# Patient Record
Sex: FEMALE | Race: OTHER | NOT HISPANIC OR LATINO | Employment: UNEMPLOYED | URBAN - METROPOLITAN AREA
[De-identification: names, ages, dates, MRNs, and addresses within clinical notes are randomized per-mention and may not be internally consistent; named-entity substitution may affect disease eponyms.]

---

## 2021-05-14 ENCOUNTER — OFFICE VISIT (OUTPATIENT)
Dept: URGENT CARE | Facility: PHYSICIAN GROUP | Age: 61
End: 2021-05-14

## 2021-05-14 VITALS
OXYGEN SATURATION: 98 % | HEIGHT: 66 IN | RESPIRATION RATE: 18 BRPM | TEMPERATURE: 97.3 F | BODY MASS INDEX: 28.12 KG/M2 | WEIGHT: 175 LBS | DIASTOLIC BLOOD PRESSURE: 58 MMHG | SYSTOLIC BLOOD PRESSURE: 120 MMHG | HEART RATE: 80 BPM

## 2021-05-14 DIAGNOSIS — B02.9 HERPES ZOSTER WITHOUT COMPLICATION: ICD-10-CM

## 2021-05-14 PROCEDURE — 99203 OFFICE O/P NEW LOW 30 MIN: CPT | Performed by: PHYSICIAN ASSISTANT

## 2021-05-14 RX ORDER — LIDOCAINE HYDROCHLORIDE 30 MG/G
1 CREAM TOPICAL 3 TIMES DAILY PRN
Qty: 28 G | Refills: 0 | Status: SHIPPED | OUTPATIENT
Start: 2021-05-14

## 2021-05-14 RX ORDER — VALACYCLOVIR HYDROCHLORIDE 1 G/1
1000 TABLET, FILM COATED ORAL 3 TIMES DAILY
Qty: 21 TABLET | Refills: 0 | Status: SHIPPED | OUTPATIENT
Start: 2021-05-14 | End: 2021-05-21

## 2021-05-14 RX ORDER — METHYLPREDNISOLONE 4 MG/1
TABLET ORAL
Qty: 21 TABLET | Refills: 0 | Status: SHIPPED | OUTPATIENT
Start: 2021-05-14

## 2021-05-14 ASSESSMENT — ENCOUNTER SYMPTOMS
CHILLS: 0
TINGLING: 1
PALPITATIONS: 0
FEVER: 0

## 2021-05-14 NOTE — PROGRESS NOTES
Subjective:   Gisselle Jacinto is a 60 y.o. female who presents today with   Chief Complaint   Patient presents with   • Bump     x2 weeks, red bumps on chest and right side of back, painful        Rash  This is a new problem. The current episode started 1 to 4 weeks ago. The problem is unchanged. The affected locations include the chest and back. The rash is characterized by blistering, pain and redness. She was exposed to nothing. Pertinent negatives include no fever. Treatments tried: topical cream, powder. The treatment provided no relief.     Patient states it started with rash on the front of her right breast and seemed to spread around under the armpit into the upper back.  Patient denies any chest pain but does state the area around the rash under her right armpit feels like tingling and poking sensation especially with irritation from her shirt or laying on that side.  PMH:  has no past medical history on file.  MEDS:   Current Outpatient Medications:   •  valacyclovir (VALTREX) 1 GM Tab, Take 1 tablet by mouth 3 times a day for 7 days., Disp: 21 tablet, Rfl: 0  •  methylPREDNISolone (MEDROL DOSEPAK) 4 MG Tablet Therapy Pack, Follow schedule on package instructions., Disp: 21 tablet, Rfl: 0  •  Lidocaine HCl 3 % Cream, Apply 1 Film topically 3 times a day as needed (pain)., Disp: 28 g, Rfl: 0  ALLERGIES: Not on File  SURGHX: History reviewed. No pertinent surgical history.  SOCHX:  reports that she has never smoked. She has never used smokeless tobacco. She reports that she does not drink alcohol and does not use drugs.  FH: Reviewed with patient, not pertinent to this visit.       Review of Systems   Constitutional: Negative for chills and fever.   Cardiovascular: Negative for chest pain and palpitations.   Skin: Positive for rash.   Neurological: Positive for tingling (Pins-and-needles around the areas of the rash).        Objective:   /58   Pulse 80   Temp 36.3 °C (97.3 °F) (Temporal)   Resp 18    "Ht 1.676 m (5' 6\")   Wt 79.4 kg (175 lb)   SpO2 98%   BMI 28.25 kg/m²   Physical Exam  Vitals and nursing note reviewed.   Constitutional:       General: She is not in acute distress.     Appearance: She is well-developed.   HENT:      Head: Normocephalic and atraumatic.      Right Ear: Hearing normal.      Left Ear: Hearing normal.   Eyes:      Conjunctiva/sclera: Conjunctivae normal.   Cardiovascular:      Rate and Rhythm: Normal rate.   Pulmonary:      Effort: Pulmonary effort is normal.   Musculoskeletal:      Comments: Normal movement in all 4 extremities   Skin:     General: Skin is warm and dry.             Comments: Vesicular rash to the right breast and to the right upper back over the scapula.  Most are dried and scabbed over but there are some new vesicles on the back.   Neurological:      Mental Status: She is alert.      Coordination: Coordination normal.   Psychiatric:         Mood and Affect: Mood normal.       Assessment/Plan:   Assessment    1. Herpes zoster without complication  - valacyclovir (VALTREX) 1 GM Tab; Take 1 tablet by mouth 3 times a day for 7 days.  Dispense: 21 tablet; Refill: 0  - methylPREDNISolone (MEDROL DOSEPAK) 4 MG Tablet Therapy Pack; Follow schedule on package instructions.  Dispense: 21 tablet; Refill: 0  - Lidocaine HCl 3 % Cream; Apply 1 Film topically 3 times a day as needed (pain).  Dispense: 28 g; Refill: 0  Discussed with patient I believe that the rash is healing well and she is likely on the tail end of this rash but I do recommend treatment with antiviral given that she has new vesicles showing up.   Differential diagnosis, natural history, supportive care, and indications for immediate follow-up discussed.   Patient given instructions and understanding of medications and treatment.    If not improving in 3-5 days, F/U with PCP or return to  if symptoms worsen.    Patient agreeable to plan.  Greater than 30 minutes were spent reviewing patient's chart, " examining and obtaining history from patient, and discussing plan of care.       Please note that this dictation was created using voice recognition software. I have made every reasonable attempt to correct obvious errors, but I expect that there are errors of grammar and possibly content that I did not discover before finalizing the note.    Nelson Stanley PA-C

## 2024-09-29 ENCOUNTER — OFFICE VISIT (OUTPATIENT)
Dept: URGENT CARE | Facility: CLINIC | Age: 64
End: 2024-09-29
Payer: COMMERCIAL

## 2024-09-29 VITALS
TEMPERATURE: 97.6 F | OXYGEN SATURATION: 98 % | HEART RATE: 88 BPM | BODY MASS INDEX: 28.93 KG/M2 | HEIGHT: 66 IN | RESPIRATION RATE: 18 BRPM | DIASTOLIC BLOOD PRESSURE: 68 MMHG | WEIGHT: 180 LBS | SYSTOLIC BLOOD PRESSURE: 118 MMHG

## 2024-09-29 DIAGNOSIS — R52 BODY ACHES: ICD-10-CM

## 2024-09-29 DIAGNOSIS — J06.9 VIRAL URI: ICD-10-CM

## 2024-09-29 LAB
FLUAV RNA SPEC QL NAA+PROBE: NEGATIVE
FLUBV RNA SPEC QL NAA+PROBE: NEGATIVE
RSV RNA SPEC QL NAA+PROBE: NEGATIVE
SARS-COV-2 RNA RESP QL NAA+PROBE: NEGATIVE

## 2024-09-29 RX ORDER — GLIMEPIRIDE 4 MG/1
4 TABLET ORAL EVERY MORNING
COMMUNITY

## 2024-09-29 RX ORDER — TRAZODONE HYDROCHLORIDE 100 MG/1
100 TABLET ORAL
COMMUNITY
Start: 2024-07-13

## 2024-09-29 RX ORDER — PANTOPRAZOLE SODIUM 40 MG/1
40 TABLET, DELAYED RELEASE ORAL
COMMUNITY
Start: 2024-08-18

## 2024-09-29 RX ORDER — ALBUTEROL SULFATE 90 UG/1
2 INHALANT RESPIRATORY (INHALATION) EVERY 6 HOURS PRN
Qty: 8.5 G | Refills: 0 | Status: SHIPPED | OUTPATIENT
Start: 2024-09-29

## 2024-09-29 RX ORDER — DILTIAZEM HYDROCHLORIDE 90 MG/1
90 CAPSULE, EXTENDED RELEASE ORAL EVERY 12 HOURS
COMMUNITY
Start: 2024-07-15

## 2024-09-29 RX ORDER — METOPROLOL SUCCINATE 25 MG/1
25 TABLET, EXTENDED RELEASE ORAL
COMMUNITY
Start: 2024-08-26

## 2024-09-29 RX ORDER — ATORVASTATIN CALCIUM 40 MG/1
40 TABLET, FILM COATED ORAL DAILY
COMMUNITY
Start: 2024-09-28

## 2024-09-29 RX ORDER — EMPAGLIFLOZIN, METFORMIN HYDROCHLORIDE 12.5; 1 MG/1; MG/1
1 TABLET, EXTENDED RELEASE ORAL
COMMUNITY
Start: 2024-07-25

## 2024-09-29 NOTE — LETTER
September 29, 2024         Patient: Gisselle Jacinto   YOB: 1960   Date of Visit: 9/29/2024           To Whom it May Concern:    Gisselle Jacinto was seen in my clinic on 9/29/2024. She may return to work on 10/3/24.    If you have any questions or concerns, please don't hesitate to call.        Sincerely,           NIKUNJ Yang  Electronically Signed

## 2024-09-30 ASSESSMENT — ENCOUNTER SYMPTOMS
SORE THROAT: 0
FEVER: 1
WHEEZING: 0
COUGH: 0
NAUSEA: 0
CHILLS: 1
DIARRHEA: 0
SHORTNESS OF BREATH: 1

## 2024-09-30 NOTE — PROGRESS NOTES
"Subjective:   Gisselle Jacinto is a 64 y.o. female who presents for Fever (Feels that she has a fever, body aches, chest feels tight and it hurts to breathe, shivering, since yesterday morning.)      Fever   This is a new problem. The current episode started yesterday. The problem occurs constantly. The problem has been unchanged. Associated symptoms include muscle aches and sleepiness. Pertinent negatives include no chest pain, coughing, diarrhea, ear pain, nausea, sore throat or wheezing. She has tried acetaminophen for the symptoms. The treatment provided no relief.   Risk factors: no recent sickness, no recent travel and no sick contacts        Review of Systems   Constitutional:  Positive for chills, fever and malaise/fatigue.   HENT:  Negative for ear pain and sore throat.    Respiratory:  Positive for shortness of breath. Negative for cough and wheezing.    Cardiovascular:  Negative for chest pain.   Gastrointestinal:  Negative for diarrhea and nausea.       Medications:    albuterol Aers  atorvastatin Tabs  diltiazem  glimepiride Tabs  Lidocaine HCl Crea  methylPREDNISolone Tbpk  metoprolol SR Tb24  pantoprazole Tbec  Synjardy XR Tb24  traZODone Tabs    Allergies: Patient has no known allergies.    Problem List: Gisselle Jacinto does not have a problem list on file.    Surgical History:  No past surgical history on file.    Past Social Hx: Gisselle Jacinto  reports that she has never smoked. She has never used smokeless tobacco. She reports that she does not drink alcohol and does not use drugs.     Past Family Hx:  Gisselle Jacinto family history is not on file.     Problem list, medications, and allergies reviewed by myself today in Epic.     Objective:     /68 (BP Location: Right arm, Patient Position: Sitting, BP Cuff Size: Adult)   Pulse 88   Temp 36.4 °C (97.6 °F) (Temporal)   Resp 18   Ht 1.676 m (5' 6\")   Wt 81.6 kg (180 lb)   SpO2 98%   BMI 29.05 kg/m²     Physical Exam  Constitutional:       General: She " is not in acute distress.     Appearance: She is well-developed.   HENT:      Head: Normocephalic and atraumatic.      Right Ear: Tympanic membrane normal.      Left Ear: Tympanic membrane normal.      Nose: Nose normal.      Right Sinus: No maxillary sinus tenderness or frontal sinus tenderness.      Left Sinus: No maxillary sinus tenderness or frontal sinus tenderness.      Mouth/Throat:      Mouth: Oropharynx is clear and moist and mucous membranes are normal. Mucous membranes are moist.      Pharynx: Oropharynx is clear. Uvula midline. No posterior oropharyngeal edema or posterior oropharyngeal erythema.      Tonsils: No tonsillar exudate or tonsillar abscesses.   Eyes:      General:         Right eye: No discharge.         Left eye: No discharge.      Conjunctiva/sclera: Conjunctivae normal.   Cardiovascular:      Rate and Rhythm: Normal rate and regular rhythm.   Pulmonary:      Effort: Pulmonary effort is normal. No respiratory distress.      Breath sounds: Normal breath sounds.   Musculoskeletal:      Cervical back: No rigidity.   Lymphadenopathy:      Cervical: No cervical adenopathy.   Skin:     General: Skin is warm, dry and intact.      Capillary Refill: Capillary refill takes less than 2 seconds.   Neurological:      Mental Status: She is alert and oriented to person, place, and time.      Sensory: No sensory deficit.      Deep Tendon Reflexes: Reflexes are normal and symmetric.   Psychiatric:         Mood and Affect: Mood normal.         Behavior: Behavior normal.         Assessment/Plan:     Diagnosis and associated orders:     1. Body aches  POCT CoV-2, Flu A/B, RSV by PCR    albuterol 108 (90 Base) MCG/ACT Aero Soln inhalation aerosol      2. Viral URI  POCT CoV-2, Flu A/B, RSV by PCR    albuterol 108 (90 Base) MCG/ACT Aero Soln inhalation aerosol         Comments/MDM:     Results for orders placed or performed in visit on 09/29/24   POCT CoV-2, Flu A/B, RSV by PCR   Result Value Ref Range     SARS-CoV-2 by PCR Negative Negative, Invalid    Influenza virus A RNA Negative Negative, Invalid    Influenza virus B, PCR Negative Negative, Invalid    RSV, PCR Negative Negative, Invalid       The patient's presenting symptoms and exam findings most likely are due to a viral etiology.   Symptomatic and supportive care:   Plenty of oral hydration and rest   Over the counter cough suppressant as directed.  Tylenol or ibuprofen for pain and fever as directed.   Warm salt water gargles for sore throat, soft foods, cool liquids.   Saline nasal spray and Flonase  Infection control measures at home. Stay away from people, Hand washing, covering sneeze/cough, disinfect surfaces.   Remain home from work, school, and other populated environments.    Overall, the patient is well-appearing. They are not hypoxic, afebrile, and a normal pulmonary exam.                      Please note that this dictation was created using voice recognition software. I have made a reasonable attempt to correct obvious errors, but I expect that there are errors of grammar and possibly content that I did not discover before finalizing the note.    This note was electronically signed by Shane FARIAS.

## 2025-06-12 ENCOUNTER — NON-PROVIDER VISIT (OUTPATIENT)
Dept: URGENT CARE | Facility: CLINIC | Age: 65
End: 2025-06-12

## 2025-06-12 DIAGNOSIS — Z11.1 PPD SCREENING TEST: Primary | ICD-10-CM

## 2025-06-12 PROCEDURE — 86580 TB INTRADERMAL TEST: CPT

## 2025-06-13 NOTE — NON-PROVIDER
Gisselle Jacinto is a 64 y.o. female here for a non-provider visit for PPD placement -- Step 1 of 1    Reason for PPD:  work requirement    1. TB evaluation questionnaire completed by patient? Yes      -  If any answers marked yes did you contact a provider prior to placing? Yes  2.  Patient notified to return to clinic for reading on: Saturday, 6/14/25 after 5:41 PM or Sunday, 6/15/25 before 5:41 PM.  3.  PPD Placement documentation completed on TB evaluation questionnaire? Yes  4.  Location of TB evaluation questionnaire filed: [facility].

## 2025-06-15 ENCOUNTER — NON-PROVIDER VISIT (OUTPATIENT)
Dept: URGENT CARE | Facility: CLINIC | Age: 65
End: 2025-06-15

## 2025-06-15 LAB — TB WHEAL 3D P 5 TU DIAM: ABNORMAL MM

## 2025-06-15 PROCEDURE — 99999 PR NO CHARGE: CPT | Performed by: NURSE PRACTITIONER

## 2025-06-15 NOTE — PROGRESS NOTES
Patient did come in for a TB Reading, reading was greater than 0, 10mm. I did inform provider Heena Crane, GETACHEW.P.R.KELLY and Dayana Ruiz, GETACHEW.P.N. They did let me know for patient since it is positive patient she does have the choice to get seen as a patient to get an X-Ray done or they can put in a order for a blood draw for TB, it is up to the patient as it is for their employment. I did inform patient and she said she will come back in 1 week and do the TB on the other arm and see if it is positive again.

## 2025-06-15 NOTE — PROGRESS NOTES
Gisselle Jacinto is a 64 y.o. female here for a non-provider visit for PPD reading -- Step 1 of 2.      1.  Resulted in Epic under enter/edit results? Yes   2.  TB evaluation questionnaire scanned into chart and original given to patient?Yes      3. Was induration greater than 0 mm? Yes, verified by Provider: Yes.    If Step 1 of 2, when is patient returning for second step (delete if N/A): N/A    Routed to PCP? Yes

## 2025-06-17 ENCOUNTER — RESULTS FOLLOW-UP (OUTPATIENT)
Dept: URGENT CARE | Facility: CLINIC | Age: 65
End: 2025-06-17

## 2025-06-17 DIAGNOSIS — R76.11 POSITIVE PPD: ICD-10-CM

## 2025-06-30 ENCOUNTER — APPOINTMENT (OUTPATIENT)
Dept: URGENT CARE | Facility: PHYSICIAN GROUP | Age: 65
End: 2025-06-30

## 2025-06-30 ENCOUNTER — HOSPITAL ENCOUNTER (OUTPATIENT)
Dept: RADIOLOGY | Facility: MEDICAL CENTER | Age: 65
End: 2025-06-30
Payer: COMMERCIAL

## 2025-06-30 ENCOUNTER — OFFICE VISIT (OUTPATIENT)
Dept: URGENT CARE | Facility: PHYSICIAN GROUP | Age: 65
End: 2025-06-30
Payer: COMMERCIAL

## 2025-06-30 VITALS
RESPIRATION RATE: 16 BRPM | DIASTOLIC BLOOD PRESSURE: 60 MMHG | OXYGEN SATURATION: 97 % | BODY MASS INDEX: 25.71 KG/M2 | HEIGHT: 66 IN | WEIGHT: 160 LBS | TEMPERATURE: 97.6 F | HEART RATE: 84 BPM | SYSTOLIC BLOOD PRESSURE: 128 MMHG

## 2025-06-30 DIAGNOSIS — R76.11 POSITIVE PPD: Primary | ICD-10-CM

## 2025-06-30 DIAGNOSIS — R76.11 POSITIVE PPD: ICD-10-CM

## 2025-06-30 PROCEDURE — 71046 X-RAY EXAM CHEST 2 VIEWS: CPT

## 2025-06-30 ASSESSMENT — ENCOUNTER SYMPTOMS
BLURRED VISION: 0
HEADACHES: 0
DOUBLE VISION: 0
ABDOMINAL PAIN: 0
SINUS PAIN: 0
DIZZINESS: 0
EYE DISCHARGE: 0
FEVER: 0
NECK PAIN: 0
SENSORY CHANGE: 0
LOSS OF CONSCIOUSNESS: 0
SHORTNESS OF BREATH: 0
FOCAL WEAKNESS: 0
TINGLING: 0
VOMITING: 0
WHEEZING: 0
WEIGHT LOSS: 0
DIARRHEA: 0
SPEECH CHANGE: 0
SORE THROAT: 0
COUGH: 0
PALPITATIONS: 0
STRIDOR: 0
NAUSEA: 0
DIAPHORESIS: 0
BLOOD IN STOOL: 0
CONSTIPATION: 0
SPUTUM PRODUCTION: 0
BACK PAIN: 0
EYE REDNESS: 0
WEAKNESS: 0
EYE PAIN: 0
CHILLS: 0
FALLS: 0
PHOTOPHOBIA: 0
SEIZURES: 0
MYALGIAS: 0

## 2025-06-30 ASSESSMENT — PAIN SCALES - GENERAL: PAINLEVEL_OUTOF10: NO PAIN

## 2025-06-30 ASSESSMENT — VISUAL ACUITY: OU: 1

## 2025-06-30 NOTE — PROGRESS NOTES
Subjective     Gisselle Jacinto is a 64 y.o. female who presents with Other (Positive ppd/X ray needed)    HPI:   Gisselle is a 65yo female presenting for a chest x-ray following a positive 10 mm PPD test. Patient denies any current symptoms or concerns. Denies cough, shortness of breath, chest pain, lower extremity swelling, diaphoresis, or weight loss.     Review of Systems   Constitutional:  Negative for chills, diaphoresis, fever, malaise/fatigue and weight loss.   HENT:  Negative for congestion, ear discharge, ear pain, sinus pain and sore throat.    Eyes:  Negative for blurred vision, double vision, photophobia, pain, discharge and redness.   Respiratory:  Negative for cough, sputum production, shortness of breath, wheezing and stridor.    Cardiovascular:  Negative for chest pain, palpitations and leg swelling.   Gastrointestinal:  Negative for abdominal pain, blood in stool, constipation, diarrhea, melena, nausea and vomiting.   Genitourinary:  Negative for dysuria.   Musculoskeletal:  Negative for back pain, falls, joint pain, myalgias and neck pain.   Skin:  Negative for rash.   Neurological:  Negative for dizziness, tingling, sensory change, speech change, focal weakness, seizures, loss of consciousness, weakness and headaches.     History reviewed. No pertinent past medical history.     History reviewed. No pertinent surgical history.     Patient has no known allergies.     Current Outpatient Medications:     traZODone (DESYREL) 100 MG Tab, Take 100 mg by mouth at bedtime., Disp: , Rfl:     pantoprazole (PROTONIX) 40 MG Tablet Delayed Response, Take 40 mg by mouth every day., Disp: , Rfl:     atorvastatin (LIPITOR) 40 MG Tab, Take 40 mg by mouth every day., Disp: , Rfl:     diltiazem (CARDIZEM SR) 90 MG SR capsule, Take 90 mg by mouth every 12 hours., Disp: , Rfl:     SYNJARDY XR 12.5-1000 MG TABLET SR 24 HR, Take 1 Tablet by mouth 1 time a day as needed., Disp: , Rfl:     glimepiride (AMARYL) 4 MG Tab, Take 4 mg  "by mouth every morning., Disp: , Rfl:     metoprolol SR (TOPROL XL) 25 MG TABLET SR 24 HR, Take 25 mg by mouth every day., Disp: , Rfl:     albuterol 108 (90 Base) MCG/ACT Aero Soln inhalation aerosol, Inhale 2 Puffs every 6 hours as needed for Shortness of Breath., Disp: 8.5 g, Rfl: 0    methylPREDNISolone (MEDROL DOSEPAK) 4 MG Tablet Therapy Pack, Follow schedule on package instructions., Disp: 21 tablet, Rfl: 0    Lidocaine HCl 3 % Cream, Apply 1 Film topically 3 times a day as needed (pain)., Disp: 28 g, Rfl: 0     Social History  Tobacco Use    Smoking status: Never    Smokeless tobacco: Never   Vaping Use    Vaping status: Never Used   Substance Use Topics    Alcohol use: Never    Drug use: Never     History reviewed. No pertinent family history.     Medications, Allergies, and current problem list reviewed today in Epic.      Objective     /60 (BP Location: Right arm, Patient Position: Sitting, BP Cuff Size: Adult)   Pulse 84   Temp 36.4 °C (97.6 °F) (Temporal)   Resp 16   Ht 1.676 m (5' 6\")   Wt 72.6 kg (160 lb)   SpO2 97%   BMI 25.82 kg/m²      Physical Exam  Vitals reviewed.   Constitutional:       General: She is not in acute distress.  HENT:      Head: No right periorbital erythema or left periorbital erythema.      Jaw: There is normal jaw occlusion. No tenderness, swelling, pain on movement or malocclusion.      Right Ear: Tympanic membrane, ear canal and external ear normal.      Left Ear: Tympanic membrane, ear canal and external ear normal.      Nose: No congestion.      Right Sinus: No maxillary sinus tenderness or frontal sinus tenderness.      Left Sinus: No maxillary sinus tenderness or frontal sinus tenderness.      Mouth/Throat:      Lips: No lesions.      Mouth: Mucous membranes are moist. No oral lesions or angioedema.      Tongue: No lesions. Tongue does not deviate from midline.      Palate: No mass and lesions.      Pharynx: Uvula midline. No oropharyngeal exudate, posterior " oropharyngeal erythema or uvula swelling.      Tonsils: No tonsillar abscesses.   Eyes:      General: Vision grossly intact. Gaze aligned appropriately. No visual field deficit.     Extraocular Movements: Extraocular movements intact.      Conjunctiva/sclera: Conjunctivae normal.      Pupils: Pupils are equal, round, and reactive to light.      Right eye: Pupil is round, reactive and not sluggish.      Left eye: Pupil is round, reactive and not sluggish.      Funduscopic exam:     Right eye: Red reflex present.         Left eye: Red reflex present.  Neck:      Trachea: Trachea normal. No abnormal tracheal secretions or tracheal deviation.   Cardiovascular:      Rate and Rhythm: Normal rate and regular rhythm.      Pulses: Normal pulses.      Heart sounds: Normal heart sounds.   Pulmonary:      Effort: Pulmonary effort is normal. No tachypnea, accessory muscle usage, prolonged expiration, respiratory distress or retractions.      Breath sounds: Normal breath sounds. No stridor, decreased air movement or transmitted upper airway sounds. No wheezing, rhonchi or rales.   Musculoskeletal:         General: Normal range of motion.      Cervical back: Full passive range of motion without pain, normal range of motion and neck supple. No erythema, rigidity, tenderness or crepitus. No pain with movement. Normal range of motion.   Lymphadenopathy:      Cervical: No cervical adenopathy.   Skin:     General: Skin is warm and dry.      Findings: No rash.   Neurological:      Mental Status: She is alert. Mental status is at baseline.      Sensory: Sensation is intact.      Motor: Motor function is intact.      Coordination: Coordination is intact.      Gait: Gait is intact.   Psychiatric:         Mood and Affect: Mood normal.         Behavior: Behavior normal.         Thought Content: Thought content normal.       DX-CHEST-2 VIEWS  Result Date: 6/30/2025 6/30/2025 12:58 PM HISTORY/REASON FOR EXAM:  Positive PPD. Pt denies current  chest complaints. TECHNIQUE/EXAM DESCRIPTION AND NUMBER OF VIEWS: Two views of the chest. COMPARISON:  None. FINDINGS: No pulmonary infiltrates or consolidations are noted. No pleural effusions, no pneumothorax are appreciated. Normal cardiopericardial silhouette.     1. No active cardiopulmonary abnormalities are identified.     Assessment & Plan    1. Positive PPD (Primary)   - DX-CHEST-2 VIEWS; Future       MDM/Comments:   Patient presenting for CXR following a positive PPD test. X-ray without evidence of active cardiopulmonary abnormalities. No current symptoms or concerns.    Lung sounds present and clear throughout. Vital signs stable. No red flag/alarm feature findings present.      Illness progression and alarm symptoms discussed with patient, emphasizing low threshold for returning to clinic/emergency department for worsening symptoms. Patient is agreeable to the plan and verbalizes understanding, and will follow up if warranted.       Differential diagnosis, natural history, supportive care, and indications for immediate follow-up discussed.      Follow-up as needed if symptoms worsen or fail to improve to PCP, Urgent care or Emergency Room.         I personally reviewed prior external notes and test results pertinent to today's visit.  I have independently reviewed and interpreted all diagnostics ordered during this urgent care visit.                               Electronically signed by JARRETT Lamas